# Patient Record
Sex: FEMALE | Race: WHITE | Employment: FULL TIME | URBAN - METROPOLITAN AREA
[De-identification: names, ages, dates, MRNs, and addresses within clinical notes are randomized per-mention and may not be internally consistent; named-entity substitution may affect disease eponyms.]

---

## 2017-06-02 NOTE — PROGRESS NOTES
OB/GYN New   6/9/2017      NAME:  Maricel Stephens  PCP:  Jade Manjarrez  MRN:  [id number]    Impression / Plan     52 year old  with:      ICD-10-CM    1. Lichen sclerosus L90.0    2. Dyspareunia, female N94.10        Discussed lichen sclerosis and agree with the diagnosis.  Discussed management options.  Agree with clobetasol for management of her lichen sclerosis.   Instructions and precautions given.      Discussed dyspareunia.  Exam is fairly normal aside form low estrogen effect.  Handout for vaginal dryness given from [employer].  Recommend vaginal moisturizers and lubrication.  Instructions given.  She may also try massaging vitamin E oil at the old episiotomy scar.      Follow up next month for Pap and to see how she has been doing.      Chief Complaint     Chief Complaint   Patient presents with     Consult     Lichen Sclerosus- 2nd opinion     Menopausal Sx       HPI     Maricel Stephens is a 52 year old female who is seen for second opinion.      She reports she was diagnosed with lichen sclerosis by a biopsy on May 2nd and prescribed clobetasol.  She states her symptoms have improved significantly.  She had been given a handout of uptodate.      Pain with intercourse.  This is new for her and has been going on since December.  Feels raw at her old episiotomy during intercourse. They have tried various lubricants.  She does not feel very dry when she initiates intercourse.      History significant for ablation and tubal at age 39.      No LMP recorded.       Problem List     There are no active problems to display for this patient.      Medications     Current Outpatient Prescriptions   Medication     clobetasol (TEMOVATE) 0.05 % ointment     valACYclovir (VALTREX) 1000 mg tablet     Calcium Carb-Cholecalciferol (CALTRATE 600+D3 SOFT) 600-800 MG-UNIT CHEW     Omega-3 Fatty Acids (FISH OIL OMEGA-3 PO)     Multiple Vitamins-Minerals (MULTIVITAL PO)     UNABLE TO FIND     Melatonin 10 MG TABS tablet      "IBUPROFEN PO     No current facility-administered medications for this visit.         Allergies     No Known Allergies    Past Medical/Surgical History     History reviewed. No pertinent past medical history.    History reviewed. No pertinent surgical history.     Social History     Social History     Social History     Marital status: Single     Spouse name: N/A     Number of children: N/A     Years of education: N/A     Occupational History     Not on file.     Social History Main Topics     Smoking status: Never Smoker     Smokeless tobacco: Not on file     Alcohol use Yes      Comment: 1 per week      Drug use: No     Sexual activity: Yes     Partners: Male     Other Topics Concern     Not on file     Social History Narrative       Family History      History reviewed. No pertinent family history.    ROS     A /GI organ review of systems was asked and the pertinent positives and negatives are listed in the HPI.     Physical Exam   Vitals: /60 (BP Location: Right arm, Patient Position: Chair, Cuff Size: Adult Small)  Pulse 72  Ht 5' 5\" (1.651 m)  Wt 133 lb 8 oz (60.6 kg)  LMP   BMI 22.22 kg/m2    General: Comfortable, no obvious distress, normal  body habitus  Psych: Alert and orientated x 3. Appropriate affect, good insight.   : External genitalia with mild hypopigmentation on labia bilaterally, appears to be improved from what she described before.  No lesions.  Urethral meatus normal.  Speculum exam reveals a normal vaginal vault, normal cervix.  No abnormal discharge.  Bimanual exam reveals a normal, mobile, nontender uterus.  No cervical motion tenderness.  Adnexa nontender with no palpable masses.   normal tone, fairly good muscle coordination.  No pain on exam.  Normal hiatus.    Extremities: No peripheral edema, nontender       30 minutes was spent with patient, more than 50% counseling and coordinating care     Cristine Murray MD       "

## 2017-06-08 ENCOUNTER — OFFICE VISIT (OUTPATIENT)
Dept: OBGYN | Facility: OTHER | Age: 52
End: 2017-06-08
Payer: COMMERCIAL

## 2017-06-08 VITALS
HEIGHT: 65 IN | BODY MASS INDEX: 22.24 KG/M2 | HEART RATE: 72 BPM | SYSTOLIC BLOOD PRESSURE: 100 MMHG | DIASTOLIC BLOOD PRESSURE: 60 MMHG | WEIGHT: 133.5 LBS

## 2017-06-08 DIAGNOSIS — N94.10 DYSPAREUNIA, FEMALE: ICD-10-CM

## 2017-06-08 DIAGNOSIS — L90.0 LICHEN SCLEROSUS: Primary | ICD-10-CM

## 2017-06-08 PROCEDURE — 99203 OFFICE O/P NEW LOW 30 MIN: CPT | Performed by: OBSTETRICS & GYNECOLOGY

## 2017-06-08 RX ORDER — PHENOL 1.4 %
1 AEROSOL, SPRAY (ML) MUCOUS MEMBRANE
COMMUNITY

## 2017-06-08 RX ORDER — CLOBETASOL PROPIONATE 0.5 MG/G
OINTMENT TOPICAL
COMMUNITY
Start: 2017-05-05 | End: 2018-07-13

## 2017-06-08 RX ORDER — VALACYCLOVIR HYDROCHLORIDE 1 G/1
TABLET, FILM COATED ORAL
COMMUNITY
Start: 2017-04-25

## 2017-06-08 ASSESSMENT — PAIN SCALES - GENERAL: PAINLEVEL: NO PAIN (0)

## 2017-06-08 NOTE — NURSING NOTE
"Chief Complaint   Patient presents with     Consult     Lichen Sclerosus- 2nd opinion     Menopausal Sx       Initial /60 (BP Location: Right arm, Patient Position: Chair, Cuff Size: Adult Small)  Pulse 72  Ht 5' 5\" (1.651 m)  Wt 133 lb 8 oz (60.6 kg)  LMP   BMI 22.22 kg/m2 Estimated body mass index is 22.22 kg/(m^2) as calculated from the following:    Height as of this encounter: 5' 5\" (1.651 m).    Weight as of this encounter: 133 lb 8 oz (60.6 kg).  Medication Reconciliation: complete   Milagros Chandler MA  June 8, 2017      "

## 2017-06-08 NOTE — MR AVS SNAPSHOT
After Visit Summary   6/8/2017    Maricel Stephens    MRN: [id number]           Patient Information     Date Of Birth          1965        Visit Information        Provider Department      6/8/2017 3:30 PM Cristine Murray MD [facility]        Today's Diagnoses     Lichen sclerosus    -  1    Dyspareunia, female          Care Instructions      Preventive Health Recommendations  Female Ages 50 - 64    Yearly exam: See your health care provider every year in order to  o Review health changes.   o Discuss preventive care.    o Review your medicines if your doctor has prescribed any.      Get a Pap test every three years (unless you have an abnormal result and your provider advises testing more often).    If you get Pap tests with HPV test, you only need to test every 5 years, unless you have an abnormal result.     You do not need a Pap test if your uterus was removed (hysterectomy) and you have not had cancer.    You should be tested each year for STDs (sexually transmitted diseases) if you're at risk.     Have a mammogram every 1 to 2 years.    Have a colonoscopy at age 50, or have a yearly FIT test (stool test). These exams screen for colon cancer.      Have a cholesterol test every 5 years, or more often if advised.    Have a diabetes test (fasting glucose) every three years. If you are at risk for diabetes, you should have this test more often.     If you are at risk for osteoporosis (brittle bone disease), think about having a bone density scan (DEXA).    Shots: Get a flu shot each year. Get a tetanus shot every 10 years.    Nutrition:     Eat at least 5 servings of fruits and vegetables each day.    Eat whole-grain bread, whole-wheat pasta and brown rice instead of white grains and rice.    Talk to your provider about Calcium and Vitamin D.     Lifestyle    Exercise at least 150 minutes a week (30 minutes a day, 5 days a week). This will help you control your weight and  "prevent disease.    Limit alcohol to one drink per day.    No smoking.     Wear sunscreen to prevent skin cancer.     See your dentist every six months for an exam and cleaning.    See your eye doctor every 1 to 2 years.            Follow-ups after your visit        Follow-up notes from your care team     Return in about 5 weeks (around 7/13/2017) for pap and follow up.      Your next 10 appointments already scheduled     Jun 23, 2017 10:45 AM CDT   [place] Visit with Cristine Murray [place]   [facility] [place]    [address] [place] [id number] [phone]           Bring a current list of meds and any records pertaining to this visit.  For Physicals, please bring immunization records and any forms needing to be filled out.  Please arrive 10 minutes early to complete paperwork.              Who to contact     If you have questions or need follow up information about today's clinic visit or your schedule please contact [facility] directly at [phone].  Normal or non-critical lab and imaging results will be communicated to you by MyChart, letter or phone within 4 business days after the clinic has received the results. If you do not hear from us within 7 days, please contact the clinic through [employer] or phone. If you have a critical or abnormal lab result, we will notify you by phone as soon as possible.  Submit refill requests through [employer] or call your pharmacy and they will forward the refill request to us. Please allow 3 business days for your refill to be completed.          Additional Information About Your Visit        [employer] Information     [employer] lets you send messages to your doctor, view your test results, renew your prescriptions, schedule appointments and more. To sign up, go to [email or web address] . Click on \"Log in\" on the left side of the screen, which will take you to the Welcome page. Then click on \"Sign up Now\" " "on the right side of the page.     You will be asked to enter the access code listed below, as well as some personal information. Please follow the directions to create your username and password.     Your access code is: [email or web address]  Expires: 2017  4:52 PM     Your access code will  in 90 days. If you need help or a new code, please call your [place] clinic or [phone].        Care EveryWhere ID     This is your Care EveryWhere ID. This could be used by other organizations to access your [place] medical records  [id number]        Your Vitals Were     Pulse Height BMI (Body Mass Index)             72 5' 5\" (1.651 m) 22.22 kg/m2          Blood Pressure from Last 3 Encounters:   17 100/60   14 139/82    Weight from Last 3 Encounters:   17 133 lb 8 oz (60.6 kg)   14 130 lb (59 kg)              Today, you had the following     No orders found for display       Primary Care Provider Office Phone # Fax #    Jade Manjarrez [phone]       [place] [phone]        Thank you!     Thank you for choosing [facility]  for your care. Our goal is always to provide you with excellent care. Hearing back from our patients is one way we can continue to improve our services. Please take a few minutes to complete the written survey that you may receive in the mail after your visit with us. Thank you!             Your Updated Medication List - Protect others around you: Learn how to safely use, store and throw away your medicines at www.disposemymeds.org.          This list is accurate as of: 17 11:59 PM.  Always use your most recent med list.                   Brand Name Dispense Instructions for use    CALTRATE 600+D3 SOFT 600-800 MG-UNIT Chew   Generic drug:  Calcium Carb-Cholecalciferol          clobetasol 0.05 % ointment    TEMOVATE         FISH OIL OMEGA-3 PO          IBUPROFEN PO          Melatonin 10 MG Tabs " tablet      Take 1 tablet by mouth       MULTIVITAL PO          UNABLE TO FIND          valACYclovir 1000 mg tablet    VALTREX

## 2017-06-23 ENCOUNTER — OFFICE VISIT (OUTPATIENT)
Dept: OBGYN | Facility: CLINIC | Age: 52
End: 2017-06-23
Payer: COMMERCIAL

## 2017-06-23 VITALS
WEIGHT: 132.9 LBS | BODY MASS INDEX: 22.12 KG/M2 | HEART RATE: 59 BPM | SYSTOLIC BLOOD PRESSURE: 106 MMHG | DIASTOLIC BLOOD PRESSURE: 68 MMHG

## 2017-06-23 DIAGNOSIS — L90.0 LICHEN SCLEROSUS: Primary | ICD-10-CM

## 2017-06-23 DIAGNOSIS — Z12.4 SCREENING FOR MALIGNANT NEOPLASM OF CERVIX: ICD-10-CM

## 2017-06-23 PROCEDURE — G0145 SCR C/V CYTO,THINLAYER,RESCR: HCPCS | Performed by: OBSTETRICS & GYNECOLOGY

## 2017-06-23 PROCEDURE — 99213 OFFICE O/P EST LOW 20 MIN: CPT | Performed by: OBSTETRICS & GYNECOLOGY

## 2017-06-23 PROCEDURE — 87624 HPV HI-RISK TYP POOLED RSLT: CPT | Performed by: OBSTETRICS & GYNECOLOGY

## 2017-06-23 NOTE — PROGRESS NOTES
OB/GYN  6/23/2017      NAME:  Maricel Stephens  PCP:  Jade Manjarrez  MRN:  [id number]    Impression / Plan     52 year old with:    1. Screening for malignant neoplasm of cervix  Pap today with HPV    2. Lichen sclerosus  Patient's symptoms have improved recently. She may take a break from the clobetasol. She can restart the clobetasol if her symptoms worsen. If she were to restart it, she can try using it nightly for 2 weeks, then 1-3 times per week. She should also follow up if her symptoms worsen or do not improve.    Patient may try over-the-counter treatments for hemorrhoid symptoms. I recommend she not use the clobetasol around the anus at this time.    We also discussed vaginal dryness and pain with intercourse. Her symptoms are improved. We discussed estradiol testing, which is not recommended at this time. If the vaginal dryness is not controlled with lubrication and moisturizers, then topical estrogen or Osphena may be considered.    She will follow up in 6 months, sooner as needed.      Chief Complaint     Chief Complaint   Patient presents with     Gyn Exam     pap and follow up menopause issues       HPI     Maricel Stephens is a 52 year old female who is seen for follow up and Pap.      I last saw the patient on 6/2/2017. At that time we discussed lichen sclerosis and dyspareunia.  Handout had been given for vaginal dryness from the [employer]. She was planning to try vaginal moisturizers and lubrication.    Pap 6/23/16:  NIL.  No HPV was done.  records from [place] recieved 6/15/17.  Prior pap in 2013 was normal.  LSIL pap in 2011 and colp with bx were normal.      Patient has been doing well since I saw her last. She has multiple questions again today. She has been using [place]ens and [place] silky and that has been working well for her. She has stopped using the clobetasol for the last week or so. Her symptoms have not returned.  She is wondering about estradiol testing to see if  she would be a candidate for vaginal estrogen    No LMP recorded. Patient has had an ablation.     Recent Mammogram at outside clinic normal.      Problem List     There are no active problems to display for this patient.      Medications     Current Outpatient Prescriptions   Medication     clobetasol (TEMOVATE) 0.05 % ointment     Calcium Carb-Cholecalciferol (CALTRATE 600+D3 SOFT) 600-800 MG-UNIT CHEW     Omega-3 Fatty Acids (FISH OIL OMEGA-3 PO)     Multiple Vitamins-Minerals (MULTIVITAL PO)     UNABLE TO FIND     valACYclovir (VALTREX) 1000 mg tablet     Melatonin 10 MG TABS tablet     IBUPROFEN PO     No current facility-administered medications for this visit.         Allergies     No Known Allergies    ROS     A /GI organ review of systems was asked and the pertinent positives and negatives are listed in the HPI.    Physical Exam   Vitals: /68 (BP Location: Right arm, Patient Position: Chair, Cuff Size: Adult Regular)  Pulse 59  Wt 60.3 kg (132 lb 14.4 oz)  BMI 22.12 kg/m2    General: Comfortable, no obvious distress, normal body habitus  Psych: Alert and orientated x 3. Appropriate affect, good insight.   : External genitalia with mild hypopigmentation on labia bilaterally improved.  Scar from the biopsy on the right.  The posterior fourchette mildly erythematous, about 1 cm area .  Urethral meatus normal.  Speculum exam reveals a normal vaginal vault, normal cervix.  No abnormal discharge.  Low estrogen effect  Extremities: No peripheral edema, nontender     Pap was done in the usual fashion      15 minutes was spent with patient, more than 50% counseling and coordinating care    Cristine Murray MD

## 2017-06-23 NOTE — MR AVS SNAPSHOT
"              After Visit Summary   2017    Maricel Stephens    MRN: [id number]           Patient Information     Date Of Birth          1965        Visit Information        Provider Department      2017 10:45 AM Cristine Murray MD [facility]        Today's Diagnoses     Lichen sclerosus    -  1    Screening for malignant neoplasm of cervix           Follow-ups after your visit        Follow-up notes from your care team     Return in about 6 months (around 2017).      Who to contact     If you have questions or need follow up information about today's clinic visit or your schedule please contact [facility] [place] at [phone].  Normal or non-critical lab and imaging results will be communicated to you by MyChart, letter or phone within 4 business days after the clinic has received the results. If you do not hear from us within 7 days, please contact the clinic through MyChart or phone. If you have a critical or abnormal lab result, we will notify you by phone as soon as possible.  Submit refill requests through [employer] or call your pharmacy and they will forward the refill request to us. Please allow 3 business days for your refill to be completed.          Additional Information About Your Visit        MyChart Information     [employer] lets you send messages to your doctor, view your test results, renew your prescriptions, schedule appointments and more. To sign up, go to [email or web address] . Click on \"Log in\" on the left side of the screen, which will take you to the Welcome page. Then click on \"Sign up Now\" on the right side of the page.     You will be asked to enter the access code listed below, as well as some personal information. Please follow the directions to create your username and password.     Your access code is: [email or web address]  Expires: 2017  4:52 PM     Your access code will  in 90 days. If you need help or a new code, " please call your [place] clinic or [phone].        Care EveryWhere ID     This is your Care EveryWhere ID. This could be used by other organizations to access your [place] medical records  [id number]        Your Vitals Were     Pulse BMI (Body Mass Index)                59 22.12 kg/m2           Blood Pressure from Last 3 Encounters:   06/23/17 106/68   06/08/17 100/60   09/19/14 139/82    Weight from Last 3 Encounters:   06/23/17 60.3 kg (132 lb 14.4 oz)   06/08/17 60.6 kg (133 lb 8 oz)   09/19/14 59 kg (130 lb)              We Performed the Following     HPV High Risk Types DNA Cervical     Pap imaged thin layer screen with HPV - recommended age 30 - 65 years (select HPV order below)        Primary Care Provider Office Phone # Fax #    Jade Manjarrez [phone]       [address] [address]        Equal Access to Services     CHUNG MCKINNEY : Hadii aad ku hadasho Soomaali, waaxda luqadaha, qaybta kaalmada adeegyada, waxay vitain [facility] . So [facility] [phone].    ATENCIÓN: Si brookela espbrandan, tiene a patterson disposición servicios gratuitos de asistencia lingüística. Llame al [phone].    We comply with applicable federal civil rights laws and [place] laws. We do not discriminate on the basis of race, color, national origin, age, disability sex, sexual orientation or gender identity.            Thank you!     Thank you for [place] [facility]  [place]. Our goal is always to provide you with excellent care. Hearing back from our patients is one way we can continue to improve our services. Please take a few minutes to complete the written survey that you may receive in the mail after your visit with us. Thank you!             Your Updated Medication List - Protect others around you: Learn how to safely use, store and throw away your medicines at www.disposemymeds.org.          This list is accurate as of: 6/23/17  11:28 AM.  Always use your most recent med list.                   Brand Name Dispense Instructions for use Diagnosis    CALTRATE 600+D3 SOFT 600-800 MG-UNIT Chew   Generic drug:  Calcium Carb-Cholecalciferol           clobetasol 0.05 % ointment    TEMOVATE          FISH OIL OMEGA-3 PO           IBUPROFEN PO           Melatonin 10 MG Tabs tablet      Take 1 tablet by mouth        MULTIVITAL PO           UNABLE TO FIND           valACYclovir 1000 mg tablet    VALTREX

## 2017-06-23 NOTE — LETTER
July 5, 2017    Maricel Stephens     [facility] [address]    Dear Maricel,  We are happy to inform you that your PAP smear result from 6/23/17 is normal.  We are now able to do a follow up test on PAP smears. The DNA test is for HPV (Human Papilloma Virus). Cervical cancer is closely linked with certain types of HPV. Your result showed no evidence of high risk HPV.  Therefore we recommend you return in 3 years for your next pap smear and HPV test.  You will still need to return to the clinic every year for an annual exam and other preventive tests.  Please contact the clinic at [phone] with any questions.  Sincerely,    Cristine Murray MD/raz

## 2017-06-23 NOTE — NURSING NOTE
"Chief Complaint   Patient presents with     Gyn Exam     pap and follow up menopause issues       Initial /68 (BP Location: Right arm, Patient Position: Chair, Cuff Size: Adult Regular)  Pulse 59  Wt 60.3 kg (132 lb 14.4 oz)  BMI 22.12 kg/m2 Estimated body mass index is 22.12 kg/(m^2) as calculated from the following:    Height as of 6/8/17: 1.651 m (5' 5\").    Weight as of this encounter: 60.3 kg (132 lb 14.4 oz).  BP completed using cuff size: regular    No obstetric history on file.    The following HM Due: mammogram  pap smear  Vaccinations: Tetanus      The following patient reported/Care Every where data was sent to:  P ABSTRACT QUALITY INITIATIVES [[id number]]  n/a     N/a    Latasha Padilla CMA  June 23, 2017           "

## 2017-06-28 LAB
COPATH REPORT: NORMAL
PAP: NORMAL

## 2017-06-29 LAB
FINAL DIAGNOSIS: NORMAL
HPV HR 12 DNA CVX QL NAA+PROBE: NEGATIVE
HPV16 DNA SPEC QL NAA+PROBE: NEGATIVE
HPV18 DNA SPEC QL NAA+PROBE: NEGATIVE
SPECIMEN DESCRIPTION: NORMAL

## 2017-07-03 PROBLEM — Z12.4 CERVICAL CANCER SCREENING: Status: ACTIVE | Noted: 2017-07-03

## 2018-07-13 ENCOUNTER — OFFICE VISIT (OUTPATIENT)
Dept: OBGYN | Facility: CLINIC | Age: 53
End: 2018-07-13
Payer: COMMERCIAL

## 2018-07-13 VITALS
BODY MASS INDEX: 21.59 KG/M2 | SYSTOLIC BLOOD PRESSURE: 102 MMHG | HEART RATE: 56 BPM | HEIGHT: 65 IN | DIASTOLIC BLOOD PRESSURE: 62 MMHG | WEIGHT: 129.6 LBS | OXYGEN SATURATION: 99 %

## 2018-07-13 DIAGNOSIS — Z11.4 ENCOUNTER FOR SCREENING FOR HIV: ICD-10-CM

## 2018-07-13 DIAGNOSIS — Z01.419 WELL FEMALE EXAM WITH ROUTINE GYNECOLOGICAL EXAM: Primary | ICD-10-CM

## 2018-07-13 DIAGNOSIS — L90.0 LICHEN SCLEROSUS: ICD-10-CM

## 2018-07-13 PROCEDURE — 99396 PREV VISIT EST AGE 40-64: CPT | Performed by: OBSTETRICS & GYNECOLOGY

## 2018-07-13 RX ORDER — GLUCOSAMINE HCL 500 MG
TABLET ORAL
COMMUNITY
End: 2019-08-01

## 2018-07-13 ASSESSMENT — ANXIETY QUESTIONNAIRES
2. NOT BEING ABLE TO STOP OR CONTROL WORRYING: SEVERAL DAYS
7. FEELING AFRAID AS IF SOMETHING AWFUL MIGHT HAPPEN: NOT AT ALL
6. BECOMING EASILY ANNOYED OR IRRITABLE: NOT AT ALL
5. BEING SO RESTLESS THAT IT IS HARD TO SIT STILL: NOT AT ALL
IF YOU CHECKED OFF ANY PROBLEMS ON THIS QUESTIONNAIRE, HOW DIFFICULT HAVE THESE PROBLEMS MADE IT FOR YOU TO DO YOUR WORK, TAKE CARE OF THINGS AT HOME, OR GET ALONG WITH OTHER PEOPLE: NOT DIFFICULT AT ALL
1. FEELING NERVOUS, ANXIOUS, OR ON EDGE: SEVERAL DAYS
3. WORRYING TOO MUCH ABOUT DIFFERENT THINGS: SEVERAL DAYS
GAD7 TOTAL SCORE: 4

## 2018-07-13 ASSESSMENT — PATIENT HEALTH QUESTIONNAIRE - PHQ9: 5. POOR APPETITE OR OVEREATING: SEVERAL DAYS

## 2018-07-13 NOTE — MR AVS SNAPSHOT
After Visit Summary   7/13/2018    Maricel Stephens    MRN: [id number]           Patient Information     Date Of Birth          1965        Visit Information        Provider Department      7/13/2018 8:45 AM Cristine Murray MD [facility] [place]        Today's Diagnoses     Well female exam with routine gynecological exam    -  1    Encounter for screening for HIV        Lichen sclerosus          Care Instructions      Preventive Health Recommendations  Female Ages 50 - 64    Yearly exam: See your health care provider every year in order to  o Review health changes.   o Discuss preventive care.    o Review your medicines if your doctor has prescribed any.      Get a Pap test every three years (unless you have an abnormal result and your provider advises testing more often).    If you get Pap tests with HPV test, you only need to test every 5 years, unless you have an abnormal result.     You do not need a Pap test if your uterus was removed (hysterectomy) and you have not had cancer.    You should be tested each year for STDs (sexually transmitted diseases) if you're at risk.     Have a mammogram every 1 to 2 years.    Have a colonoscopy at age 50, or have a yearly FIT test (stool test). These exams screen for colon cancer.      Have a cholesterol test every 5 years, or more often if advised.    Have a diabetes test (fasting glucose) every three years. If you are at risk for diabetes, you should have this test more often.     If you are at risk for osteoporosis (brittle bone disease), think about having a bone density scan (DEXA).    Shots: Get a flu shot each year. Get a tetanus shot every 10 years.    Nutrition:     Eat at least 5 servings of fruits and vegetables each day.    Eat whole-grain bread, whole-wheat pasta and brown rice instead of white grains and rice.    Get adequate Calcium and Vitamin D.     Lifestyle    Exercise at least 150 minutes a week (30 minutes a day, 5 days  "a week). This will help you control your weight and prevent disease.    Limit alcohol to one drink per day.    No smoking.     Wear sunscreen to prevent skin cancer.     See your dentist every six months for an exam and cleaning.    See your eye doctor every 1 to 2 years.            Follow-ups after your visit        Follow-up notes from your care team     Return in about 1 year (around 7/13/2019).      Future tests that were ordered for you today     Open Future Orders        Priority Expected Expires Ordered    HIV Antigen Antibody Combo Routine  7/13/2019 7/13/2018            Who to contact     If you have questions or need follow up information about today's clinic visit or your schedule please contact [facility] directly at [phone].  Normal or non-critical lab and imaging results will be communicated to you by [employer], letter or phone within 4 business days after the clinic has received the results. If you do not hear from us within 7 days, please contact the clinic through [employer] or phone. If you have a critical or abnormal lab result, we will notify you by phone as soon as possible.  Submit refill requests through [employer] or call your pharmacy and they will forward the refill request to us. Please allow 3 business days for your refill to be completed.          Additional Information About Your Visit        [employer] Information     [employer] lets you send messages to your doctor, view your test results, renew your prescriptions, schedule appointments and more. To sign up, go to [email or web address] . Click on \"Log in\" on the left side of the screen, which will take you to the Welcome page. Then click on \"Sign up Now\" on the right side of the page.     You will be asked to enter the access code listed below, as well as some personal information. Please follow the directions to create your username and password.     Your access code is: [email or web address]  Expires: 10/11/2018 11:50 AM     Your " "access code will  in 90 days. If you need help or a new code, please call your [place] clinic or [phone].        Care EveryWhere ID     This is your Care EveryWhere ID. This could be used by other organizations to access your [place] medical records  [id number]        Your Vitals Were     Pulse Height Pulse Oximetry Breastfeeding? BMI (Body Mass Index)       56 5' 5\" (1.651 m) 99% No 21.57 kg/m2        Blood Pressure from Last 3 Encounters:   18 102/62   17 106/68   17 100/60    Weight from Last 3 Encounters:   18 129 lb 9.6 oz (58.8 kg)   17 132 lb 14.4 oz (60.3 kg)   17 133 lb 8 oz (60.6 kg)                 Today's Medication Changes          These changes are accurate as of 18 11:50 AM.  If you have any questions, ask your nurse or doctor.               Stop taking these medicines if you haven't already. Please contact your care team if you have questions.     clobetasol 0.05 % ointment   Commonly known as:  TEMOVATE   Stopped by:  Cristine Murray MD                    Primary Care Provider Office Phone #    Jade Manjarrez -[phone]       XX RETIRED XX  [facility] [id number]        Equal Access to Services     [facility] AH: Hadii erum childress hadasho Somarilee, waaxda luqadaha, qaybta kaalmada adeegyada, sameera grove . So [facility] [phone].    ATENCIÓN: Si habla español, tiene a patterson disposición servicios gratuitos de asistencia lingüística. Llame al [phone].    We comply with applicable federal civil rights laws and [place] laws. We do not discriminate on the basis of race, color, national origin, age, disability, sex, sexual orientation, or gender identity.            Thank you!     Thank you for choosing [facility]  for your care. Our goal is always to provide you with excellent care. Hearing back from our patients is one way we can continue to improve our services. Please take a few minutes to complete " the written survey that you may receive in the mail after your visit with us. Thank you!             Your Updated Medication List - Protect others around you: Learn how to safely use, store and throw away your medicines at www.disposemymeds.org.          This list is accurate as of 7/13/18 11:50 AM.  Always use your most recent med list.                   Brand Name Dispense Instructions for use Diagnosis    CALTRATE 600+D3 SOFT 600-800 MG-UNIT Chew   Generic drug:  Calcium Carb-Cholecalciferol           coenzyme Q-10 100 MG Tabs           FISH OIL OMEGA-3 PO           IBUPROFEN PO           Melatonin 10 MG Tabs tablet      Take 1 tablet by mouth        MULTIVITAL PO           PRASTERONE VA      Prasterone, DHEA vaginal cream        UNABLE TO FIND      Resveratrol and Polythenol        valACYclovir 1000 mg tablet    VALTREX          VITAMIN D (CHOLECALCIFEROL) PO      Take by mouth daily

## 2018-07-14 ASSESSMENT — ANXIETY QUESTIONNAIRES: GAD7 TOTAL SCORE: 4

## 2018-07-14 ASSESSMENT — PATIENT HEALTH QUESTIONNAIRE - PHQ9: SUM OF ALL RESPONSES TO PHQ QUESTIONS 1-9: 2

## 2018-08-15 DIAGNOSIS — Z11.4 ENCOUNTER FOR SCREENING FOR HIV: ICD-10-CM

## 2018-08-15 LAB — HIV 1+2 AB+HIV1 P24 AG SERPL QL IA: NONREACTIVE

## 2018-08-15 PROCEDURE — 36415 COLL VENOUS BLD VENIPUNCTURE: CPT | Performed by: OBSTETRICS & GYNECOLOGY

## 2018-08-15 PROCEDURE — 87389 HIV-1 AG W/HIV-1&-2 AB AG IA: CPT | Performed by: OBSTETRICS & GYNECOLOGY

## 2019-06-03 ENCOUNTER — OFFICE VISIT (OUTPATIENT)
Dept: OBGYN | Facility: OTHER | Age: 54
End: 2019-06-03
Payer: COMMERCIAL

## 2019-06-03 VITALS
WEIGHT: 131.75 LBS | HEIGHT: 65 IN | DIASTOLIC BLOOD PRESSURE: 70 MMHG | BODY MASS INDEX: 21.95 KG/M2 | SYSTOLIC BLOOD PRESSURE: 108 MMHG | HEART RATE: 76 BPM

## 2019-06-03 DIAGNOSIS — L90.0 LICHEN SCLEROSUS: ICD-10-CM

## 2019-06-03 DIAGNOSIS — N95.2 ATROPHIC VAGINITIS: Primary | ICD-10-CM

## 2019-06-03 PROCEDURE — 99213 OFFICE O/P EST LOW 20 MIN: CPT | Performed by: OBSTETRICS & GYNECOLOGY

## 2019-06-03 RX ORDER — ESTRADIOL 0.1 MG/G
2 CREAM VAGINAL
Qty: 42.5 G | Refills: 4 | Status: SHIPPED | OUTPATIENT
Start: 2019-06-03

## 2019-06-03 ASSESSMENT — MIFFLIN-ST. JEOR: SCORE: 1190.99

## 2019-06-03 NOTE — PROGRESS NOTES
OB/GYN      NAME:  Maricel Stephens  PCP:  Jade Manjarrez  MRN:  [id number]    Impression / Plan     54 year old  with:      ICD-10-CM    1. Atrophic vaginitis N95.2 estradiol (ESTRACE) 0.1 MG/GM vaginal cream   2. Lichen sclerosus L90.0        Discussed exam findings.  Patient would like to try topical estrogen. She will stop the DHEA.  Instructions and precautions given. She will restart the clobetasol twice per week.  Follow up in 1-2 months.    Plan Pap in 2020    Chief Complaint     Chief Complaint   Patient presents with     Vaginal Problem     vaginal sore       HPI     Maricel Stephens is a  54 year old female who is seen for sore at the posterior fourchette.  I last saw her about a year ago for her annual exam.  We discussed lichen sclerosis and GSM at that time.  Her symptoms were controled with DHEA and clobetasol.  She saw Gyn at [place] for same.  No change in plan.     Patient has not used either medication for a month .  She had intercourse last night and noticed a sore on her bottom this am.  She feels like she tore along her old epis site.      No LMP recorded (lmp unknown). Patient has had an ablation.     Date of Last Pap Smear:   Lab Results   Component Value Date    PAP NIL 2017       Problem List     Patient Active Problem List    Diagnosis Date Noted     Cervical cancer screening 2017     Priority: Medium      Endometrial ablation.  11 LSIL pap/+ HR HPV 54  11 LSIL pap/neg HPV.  11 [facility]: neg path.    and  both NIL pap/neg HPV.  13 NIL pap/neg HR HPV.  1/8/15 NIL pap/neg HR HPV. Care Everywhere   16 NIL pap  17 NIL pap/neg HR HPV. Plan: Routine screening.           Medications     Current Outpatient Medications   Medication     Calcium Carb-Cholecalciferol (CALTRATE 600+D3 SOFT) 600-800 MG-UNIT CHEW     estradiol (ESTRACE) 0.1 MG/GM vaginal cream     IBUPROFEN PO     Multiple Vitamins-Minerals (MULTIVITAL PO)     Omega-3  "Fatty Acids (FISH OIL OMEGA-3 PO)     PRASTERONE VA     valACYclovir (VALTREX) 1000 mg tablet     VITAMIN D, CHOLECALCIFEROL, PO     coenzyme Q-10 100 MG TABS     Melatonin 10 MG TABS tablet     UNABLE TO FIND     No current facility-administered medications for this visit.         Allergies     No Known Allergies    ROS     A 6 organ review of systems was asked and the pertinent positives and negatives are listed in the HPI. All other organ systems can be considered negative.     Physical Exam   Vitals: /70 (BP Location: Left arm, Patient Position: Chair, Cuff Size: Adult Regular)   Pulse 76   Ht 1.647 m (5' 4.84\")   Wt 59.8 kg (131 lb 12 oz)   LMP  (LMP Unknown)   BMI 22.03 kg/m      General: Comfortable, no obvious distress, normal  body habitus  : small tear at 6 clock, no active bleeding.  Surrounding skin is hypopigmented and thin, consistent with LS.  No other lesions noted.  No abnormal discharge.  Low estrogen effect.         20 minutes was spent with patient, more than 50% counseling and coordinating care      Cristine Murray MD     "

## 2019-06-03 NOTE — NURSING NOTE
"Chief Complaint   Patient presents with     Vaginal Problem     vaginal sore       Initial /70 (BP Location: Left arm, Patient Position: Chair, Cuff Size: Adult Regular)   Pulse 76   Ht 1.647 m (5' 4.84\")   Wt 59.8 kg (131 lb 12 oz)   LMP  (LMP Unknown)   BMI 22.03 kg/m   Estimated body mass index is 22.03 kg/m  as calculated from the following:    Height as of this encounter: 1.647 m (5' 4.84\").    Weight as of this encounter: 59.8 kg (131 lb 12 oz).  BP completed using cuff size: regular        The following HM Due: NONE      The following patient reported/Care Every where data was sent to:  P ABSTRACT QUALITY INITIATIVES [[id number]]      N/a    Latasha Padilla CMA  Sarita 3, 2019           "

## 2019-08-01 ENCOUNTER — OFFICE VISIT (OUTPATIENT)
Dept: OBGYN | Facility: OTHER | Age: 54
End: 2019-08-01
Payer: COMMERCIAL

## 2019-08-01 VITALS
HEIGHT: 65 IN | SYSTOLIC BLOOD PRESSURE: 106 MMHG | BODY MASS INDEX: 21.62 KG/M2 | DIASTOLIC BLOOD PRESSURE: 60 MMHG | WEIGHT: 129.75 LBS | HEART RATE: 64 BPM

## 2019-08-01 DIAGNOSIS — Z01.419 WELL FEMALE EXAM WITH ROUTINE GYNECOLOGICAL EXAM: Primary | ICD-10-CM

## 2019-08-01 PROCEDURE — 99396 PREV VISIT EST AGE 40-64: CPT | Performed by: OBSTETRICS & GYNECOLOGY

## 2019-08-01 RX ORDER — CLOBETASOL PROPIONATE 0.5 MG/G
CREAM TOPICAL
COMMUNITY

## 2019-08-01 ASSESSMENT — MIFFLIN-ST. JEOR: SCORE: 1181.92

## 2019-08-01 NOTE — PROGRESS NOTES
SUBJECTIVE:   CC: Maricel Stephens is an 54 year old woman who presents for preventive health visit.     Healthy Habits:     Getting at least 3 servings of Calcium per day:  Yes    Bi-annual eye exam:  Yes    Dental care twice a year:  Yes    Sleep apnea or symptoms of sleep apnea:  Daytime drowsiness    Diet:  Regular (no restrictions)    Frequency of exercise:  4-5 days/week    Duration of exercise:  30-45 minutes    Taking medications regularly:  Yes    Medication side effects:  None    PHQ-2 Total Score: 0    Additional concerns today:  No    No concerns    She is not having problems with the Lichen sclerosis. Note she does have pathology proven LS.  I have also seen her in the past for atrophic vaginitis. She is doing well with the estrace.      Today's PHQ-2 Score:   PHQ-2 ( 1999 Pfizer) 8/1/2019   Q1: Little interest or pleasure in doing things 0   Q2: Feeling down, depressed or hopeless 0   PHQ-2 Score 0   Q1: Little interest or pleasure in doing things Not at all   Q2: Feeling down, depressed or hopeless Not at all   PHQ-2 Score 0       Abuse: Current or Past(Physical, Sexual or Emotional)- No  Do you feel safe in your environment? Yes    Social History     Tobacco Use     Smoking status: Never Smoker     Smokeless tobacco: Never Used   Substance Use Topics     Alcohol use: Yes     Comment: 1 per week      If you drink alcohol do you typically have >3 drinks per day or >7 drinks per week? No    Alcohol Use 8/1/2019   Prescreen: >3 drinks/day or >7 drinks/week? No   Prescreen: >3 drinks/day or >7 drinks/week? -   No flowsheet data found.      BP Readings from Last 3 Encounters:   08/01/19 106/60   06/03/19 108/70   07/13/18 102/62    Wt Readings from Last 3 Encounters:   08/01/19 58.9 kg (129 lb 12 oz)   06/03/19 59.8 kg (131 lb 12 oz)   07/13/18 58.8 kg (129 lb 9.6 oz)                  Patient Active Problem List   Diagnosis     Cervical cancer screening     Past Surgical History:   Procedure Laterality  Date     HC ABLATION, ENDOMETRIAL, THERMAL, W/O HYSTEROSCOPIC GUIDANCE  2009    Care Everywhere.     TUBAL LIGATION         Social History     Tobacco Use     Smoking status: Never Smoker     Smokeless tobacco: Never Used   Substance Use Topics     Alcohol use: Yes     Comment: 1 per week      History reviewed. No pertinent family history.      Current Outpatient Medications   Medication Sig Dispense Refill     Calcium Carb-Cholecalciferol (CALTRATE 600+D3 SOFT) 600-800 MG-UNIT CHEW        clobetasol (TEMOVATE) 0.05 % CREA cream        estradiol (ESTRACE) 0.1 MG/GM vaginal cream Place 2 g vaginally twice a week 42.5 g 4     IBUPROFEN PO        Multiple Vitamins-Minerals (MULTIVITAL PO)        Omega-3 Fatty Acids (FISH OIL OMEGA-3 PO)        valACYclovir (VALTREX) 1000 mg tablet        coenzyme Q-10 100 MG TABS        Melatonin 10 MG TABS tablet Take 1 tablet by mouth       UNABLE TO FIND Resveratrol and Polythenol       VITAMIN D, CHOLECALCIFEROL, PO Take by mouth daily       No Known Allergies  No lab results found.     Mammogram Screening: Patient over age 50, mutual decision to screen reflected in health maintenance.    Pertinent mammograms are reviewed under the imaging tab.    History of abnormal Pap smear: YES - updated in Problem List and Health Maintenance accordingly  PAP / HPV Latest Ref Rng & Units 6/23/2017   PAP - NIL   HPV 16 DNA NEG Negative   HPV 18 DNA NEG Negative   OTHER HR HPV NEG Negative       Review of Systems  CONSTITUTIONAL: NEGATIVE for fever, chills, change in weight  INTEGUMENTARY/SKIN: NEGATIVE for worrisome rashes, moles or lesions  EYES: NEGATIVE for vision changes or irritation  ENT: NEGATIVE for ear, mouth and throat problems  RESP: NEGATIVE for significant cough or SOB  BREAST: NEGATIVE for masses, tenderness or discharge  CV: NEGATIVE for chest pain, palpitations or peripheral edema  GI: NEGATIVE for nausea, abdominal pain, heartburn, or change in bowel habits  : NEGATIVE for  "unusual urinary or vaginal symptoms. No vaginal bleeding.   MUSCULOSKELETAL: NEGATIVE for significant arthralgias or myalgia  NEURO: NEGATIVE for weakness, dizziness or paresthesias  PSYCHIATRIC: NEGATIVE for changes in mood or affect      OBJECTIVE:   /60 (BP Location: Right arm, Patient Position: Chair, Cuff Size: Adult Regular)   Pulse 64   Ht 1.647 m (5' 4.84\")   Wt 58.9 kg (129 lb 12 oz)   BMI 21.70 kg/m    Physical Exam  Gen: Alert and oriented times 3, no acute distress.  Well developed, well nourished, pleasant.    Neck: Supple, no masses.  No thyromegaly.  Breast: Symmetrical without lesions.  No dimpling, nipple discharge, or discrete masses.  No lymphadenopathy.  Chest:  Non labored.  Clear to auscultation bilaterally.    Heart: Regular, normal S1, S2.  No murmurs.   Abdomen: Soft, nontender, nondistended.  No hepatosplenomegaly.    :  Normal female external genitalia.  No lesions.  Urethral meatus normal.  Small area at posterior fourchette consistent with lichen sclerosis.  Speculum exam reveals a normal vaginal vault, normal cervix .  No abnormal discharge.  Bimanual exam reveals a normal, mobile, nontender uterus .  No cervical motion tenderness.  Adnexa nontender with no palpable masses.    Extremities:  Nontender, no edema.    Pap obtained:  No     ASSESSMENT/PLAN:       ICD-10-CM    1. Well female exam with routine gynecological exam Z01.419        Mammogram scheduled after this visit, but she is not sure what system it is with.    She will continue clobetasol use sparingly. Also twice weekly estrace vaginal cream.  She will contact me if she has concerns, otherwise follow up in one year.    Lipid screening done yesterday with her naturopathic provider.  She will forward those results.      Pap - long discussion.  She is ok waiting until next year for Pap.  She has a new partner as of two years ago.   Pap was normal in 2017 and she has had regular screening with negative HPV since 2011. " "     COUNSELING:  Reviewed preventive health counseling, as reflected in patient instructions    Estimated body mass index is 21.7 kg/m  as calculated from the following:    Height as of this encounter: 1.647 m (5' 4.84\").    Weight as of this encounter: 58.9 kg (129 lb 12 oz).         reports that she has never smoked. She has never used smokeless tobacco.      Counseling Resources:  ATP IV Guidelines  Pooled Cohorts Equation Calculator  Breast Cancer Risk Calculator  FRAX Risk Assessment  ICSI Preventive Guidelines  Dietary Guidelines for Americans, 2010  USDA's MyPlate  ASA Prophylaxis  Lung CA Screening    Cristine Murray MD  [facility]  "

## 2019-08-01 NOTE — NURSING NOTE
"Chief Complaint   Patient presents with     Physical       Initial /60 (BP Location: Right arm, Patient Position: Chair, Cuff Size: Adult Regular)   Pulse 64   Ht 1.647 m (5' 4.84\")   Wt 58.9 kg (129 lb 12 oz)   BMI 21.70 kg/m   Estimated body mass index is 21.7 kg/m  as calculated from the following:    Height as of this encounter: 1.647 m (5' 4.84\").    Weight as of this encounter: 58.9 kg (129 lb 12 oz).  BP completed using cuff size: regular        The following HM Due: NONE      The following patient reported/Care Every where data was sent to:  P ABSTRACT QUALITY INITIATIVES [[id number]]       N/a    Latasha Padilla, [facility]  2019           "

## 2024-10-29 NOTE — PROGRESS NOTES
SUBJECTIVE:   CC: Maricel Stephens is an 53 year old woman who presents for preventive health visit.     Healthy Habits:    Do you get at least three servings of calcium containing foods daily (dairy, green leafy vegetables, etc.)? no, taking calcium and/or vitamin D supplement: yes - both    Amount of exercise or daily activities, outside of work: 3 day(s) per week    Problems taking medications regularly No    Medication side effects: No    Have you had an eye exam in the past two years? yes    Do you see a dentist twice per year? yes    Do you have sleep apnea, excessive snoring or daytime drowsiness?yes      She has been using the DHEA and findings some benefit.  She has used this for about 6 weeks.  She has been seeing family medicine at [facility] in [place]    She is not having problems with the Lichen sclerosis. Note she does have pathology proven LS.      Today's PHQ-2 Score: No flowsheet data found.    Abuse: Current or Past(Physical, Sexual or Emotional)- No  Do you feel safe in your environment - Yes    Social History   Substance Use Topics     Smoking status: Never Smoker     Smokeless tobacco: Never Used     Alcohol use Yes      Comment: 1 per week      If you drink alcohol do you typically have >3 drinks per day or >7 drinks per week? No                     BP Readings from Last 3 Encounters:   07/13/18 102/62   06/23/17 106/68   06/08/17 100/60    Wt Readings from Last 3 Encounters:   07/13/18 129 lb 9.6 oz (58.8 kg)   06/23/17 132 lb 14.4 oz (60.3 kg)   06/08/17 133 lb 8 oz (60.6 kg)                  Patient Active Problem List   Diagnosis     Cervical cancer screening     Past Surgical History:   Procedure Laterality Date     HC ABLATION, ENDOMETRIAL, THERMAL, W/O HYSTEROSCOPIC GUIDANCE  2009    Care Everywhere.     TUBAL LIGATION         Social History   Substance Use Topics     Smoking status: Never Smoker     Smokeless tobacco: Never Used     Alcohol use Yes      Comment: 1 per week       History reviewed. No pertinent family history.      Current Outpatient Prescriptions   Medication Sig Dispense Refill     Calcium Carb-Cholecalciferol (CALTRATE 600+D3 SOFT) 600-800 MG-UNIT CHEW        coenzyme Q-10 100 MG TABS        IBUPROFEN PO        Melatonin 10 MG TABS tablet Take 1 tablet by mouth       Multiple Vitamins-Minerals (MULTIVITAL PO)        Omega-3 Fatty Acids (FISH OIL OMEGA-3 PO)        PRASTERONE VA Prasterone, DHEA vaginal cream       UNABLE TO FIND Resveratrol and Polythenol       valACYclovir (VALTREX) 1000 mg tablet        VITAMIN D, CHOLECALCIFEROL, PO Take by mouth daily       No Known Allergies  No lab results found.     Patient over age 50, mutual decision to screen reflected in health maintenance.  MAMMOGRAM done in June and normal.      History of abnormal Pap smear:   Pap 6/23/16:  NIL.  No HPV was done.  records from [place] recieved 6/15/17.  Prior pap in 2013 was normal.  LSIL pap in 2011 and colp with bx were normal.      PAP / HPV Latest Ref Rng & Units 6/23/2017   PAP - NIL   HPV 16 DNA NEG Negative   HPV 18 DNA NEG Negative   OTHER HR HPV NEG Negative         ROS:  CONSTITUTIONAL: NEGATIVE for fever, chills, change in weight  INTEGUMENTARY/SKIN: NEGATIVE for worrisome rashes, moles or lesions  EYES: NEGATIVE for vision changes or irritation  ENT: NEGATIVE for ear, mouth and throat problems  RESP: NEGATIVE for significant cough or SOB  BREAST: NEGATIVE for masses, tenderness or discharge  CV: NEGATIVE for chest pain, palpitations or peripheral edema  GI: NEGATIVE for nausea, abdominal pain, heartburn, or change in bowel habits  : NEGATIVE for unusual urinary or vaginal symptoms. No vaginal bleeding. She continues to have redness and soreness in the vaginal area but this is improved  MUSCULOSKELETAL: NEGATIVE for significant arthralgias or myalgia  NEURO: NEGATIVE for weakness, dizziness or paresthesias  PSYCHIATRIC: NEGATIVE for changes in mood or affect     OBJECTIVE:   BP  "102/62  Pulse 56  Ht 5' 5\" (1.651 m)  Wt 129 lb 9.6 oz (58.8 kg)  SpO2 99%  Breastfeeding? No  BMI 21.57 kg/m2  EXAM:  Gen: Alert and oriented times 3, no acute distress.  Well developed, well nourished, pleasant.    Neck: Supple, no masses.  No thyromegaly.  Breast: Symmetrical without lesions.  No dimpling, nipple discharge, or discrete masses.  No lymphadenopathy.  Chest:  Non labored.  Clear to auscultation bilaterally.    Heart: Regular, normal S1, S2.  No murmurs.   Abdomen: Soft, nontender, nondistended.  No hepatosplenomegaly.    : Low estrogen effect and some mild changes consistent with LS on the right labia and episiotomy scar.   Urethral meatus normal.  Speculum exam reveals a normal vaginal vault, normal cervix.  No abnormal discharge.  Bimanual exam reveals a normal, mobile, nontender uterus.  No cervical motion tenderness.  Adnexa nontender with no palpable masses.    Extremities:  Nontender, no edema.    Pap obtained:  No    ASSESSMENT/PLAN:       ICD-10-CM    1. Well female exam with routine gynecological exam Z01.419    2. Encounter for screening for HIV Z11.4 HIV Antigen Antibody Combo   she is going to check to see if the HIV screen is covered by insurance.     She was planning to see someone at [place] for the LS.   Recommend she have 1-2 times yearly pelvic exams to eval LS.     She would be due for a Pap in 2-4 years, but plan to repeat next year per patient request.   Colonoscopy due in the next 1-2 years (last done 10/10/12  Mammogram next year (done 6/11/18 at [facility])  HCV done at [facility] negative 3/14/18  Normal Cholesterol 3/14/18  HgbA1C 5.3 3/20/18    COUNSELING:   Reviewed preventive health counseling, as reflected in patient instructions    BP Readings from Last 1 Encounters:   07/13/18 102/62     Estimated body mass index is 21.57 kg/(m^2) as calculated from the following:    Height as of this encounter: 5' 5\" (1.651 m).    Weight as of this encounter: 129 lb 9.6 oz (58.8 " kg).           reports that she has never smoked. She has never used smokeless tobacco.      Counseling Resources:  ATP IV Guidelines  Pooled Cohorts Equation Calculator  Breast Cancer Risk Calculator  FRAX Risk Assessment  ICSI Preventive Guidelines  Dietary Guidelines for Americans, 2010  USDA's MyPlate  ASA Prophylaxis  Lung CA Screening    Cristine Murray MD  [facility] [place]   No

## 2025-01-29 ENCOUNTER — MEDICAL CORRESPONDENCE (OUTPATIENT)
Dept: HEALTH INFORMATION MANAGEMENT | Facility: CLINIC | Age: 60
End: 2025-01-29
Payer: COMMERCIAL